# Patient Record
Sex: MALE | Race: WHITE | Employment: UNEMPLOYED | URBAN - METROPOLITAN AREA
[De-identification: names, ages, dates, MRNs, and addresses within clinical notes are randomized per-mention and may not be internally consistent; named-entity substitution may affect disease eponyms.]

---
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
## 2024-01-18 ENCOUNTER — RX ONLY (RX ONLY)
Age: 19
End: 2024-01-18

## 2024-01-18 RX ORDER — KETOCONAZOLE 20 MG/ML
SHAMPOO, SUSPENSION TOPICAL
Qty: 120 | Refills: 6 | Status: ERX | COMMUNITY
Start: 2024-01-18

## 2024-01-18 RX ORDER — CLOBETASOL PROPIONATE 0.5 MG/G
AEROSOL, FOAM TOPICAL
Qty: 100 | Refills: 2 | Status: ERX | COMMUNITY
Start: 2024-01-18

## 2024-03-18 ENCOUNTER — OFFICE VISIT (OUTPATIENT)
Age: 19
End: 2024-03-18
Payer: COMMERCIAL

## 2024-03-18 VITALS
HEART RATE: 117 BPM | DIASTOLIC BLOOD PRESSURE: 82 MMHG | RESPIRATION RATE: 20 BRPM | OXYGEN SATURATION: 98 % | BODY MASS INDEX: 28.99 KG/M2 | TEMPERATURE: 97.4 F | SYSTOLIC BLOOD PRESSURE: 118 MMHG | HEIGHT: 65 IN | WEIGHT: 174 LBS

## 2024-03-18 DIAGNOSIS — E10.65 TYPE 1 DIABETES MELLITUS WITH HYPERGLYCEMIA (HCC): ICD-10-CM

## 2024-03-18 DIAGNOSIS — E10.65 TYPE 1 DIABETES MELLITUS WITH HYPERGLYCEMIA (HCC): Primary | ICD-10-CM

## 2024-03-18 LAB — HBA1C MFR BLD: 8.1 %

## 2024-03-18 PROCEDURE — 99215 OFFICE O/P EST HI 40 MIN: CPT | Performed by: STUDENT IN AN ORGANIZED HEALTH CARE EDUCATION/TRAINING PROGRAM

## 2024-03-18 PROCEDURE — 83036 HEMOGLOBIN GLYCOSYLATED A1C: CPT | Performed by: STUDENT IN AN ORGANIZED HEALTH CARE EDUCATION/TRAINING PROGRAM

## 2024-03-18 PROCEDURE — 95251 CONT GLUC MNTR ANALYSIS I&R: CPT | Performed by: STUDENT IN AN ORGANIZED HEALTH CARE EDUCATION/TRAINING PROGRAM

## 2024-03-18 NOTE — PATIENT INSTRUCTIONS
Seen for type 1 diabetes.  HbA1c today: 8.1%.  Target is <7.0%.         Plan  Importance of compliance reinforced   Send us records in a week to review for any insulin dose adjustements  Review checking ketones when vomiting, 2 consecutive blood glucose above 350,  illness  When trace or small drink more water and keep checking until negative. If moderate or large give us a call #[phone]  Target before activity >150, if below get something with carbs,protein and fat (granula bar) .           Yearly eye exams are recommended after you have had diabetes for 3-5 years  Dental exams every 6 months are recommended  Flu vaccine is recommended every year, as early in the season as possible  Medical ID should be worn at all times  Continue rotating injection/insertion sites  Annual labs are due: Ordered today.  Will give family a call to discuss the results once I receive them.    New insulin regimen:    Humalog insulin via Omnipod insulin pump as follows    Insulin Instructions  Pump Settings   Omnipod 5 G6 Pods (Gen 5) Misc   Last edited by Renard Cote RD on 3/18/2024 at 1:39 PM      Basal Rate   Total Basal Dose: 48.75 units/day   Time units/hr   12:00 AM 1.05    4:00 AM 2.15    6:00 AM 2.45    5:00 PM 1.9      Blood Glucose Target   Time mg/dL   12:00  - 110      Sensitivity Factor   Time mg/dL/unit   12:00 AM 30    6:00 AM 20      Carb Ratio   Time g/unit   12:00 AM 5

## 2024-03-18 NOTE — PROGRESS NOTES
[facility] Encounter with Harry Cohen for follow up of Type 1 Diabetes. Accompanied today by mom  and dad.      Renard Cote RD, [facility]    Start time: 1:29 PM EDT  End Time: 1:34 PM EDT    Total time: 5 minutes spent with this clinician      Complete insulin delivery via: Omnipod 5  Insights from device download: Dexcom G6 with phone zachery    Time in Range (  mg/dl): 64%1% low  TDD: 68.3 units     Not in school and employer retired in December; may be going back to school but right now he is not getting much acitivity      [x] Glucagon - GVOKE Reviewed how to use and ensure that they check the expiration date  [x] Ketones - discussed need to use with stress/illness/elevated blood sugar- less than 6 months old if opened. Need for home and school   [x] Injection sites  - ABDOMEN, POSTERIOR ARM, and THIGH ; Rotations of these sites Yes  Signs of Overuse to same area or lipohypertrophy: No  [x] Carb counting skills assessed including resources used - no issues     Family requested new camo bag and cleaned up his profile in the controller to get rid of duplicate entries.     Pt considering a  technical school that has a nursing staff so dad is going to have them call us to get the info they need to consider him as a student. Let dad know we can provided a OP5 resource book and caregiver guide and if they need dexcom info we have that as well.        Diagnosis date: 1/2014   Length of diabetes diagnosis: 10 years         Recent Results (from the past 12 hour(s))   AMB POC HEMOGLOBIN A1C    Collection Time: 03/18/24  1:30 PM   Result Value Ref Range    Hemoglobin A1C, POC 8.1 %       Hemoglobin A1C, POC   Date Value Ref Range Status   03/18/2024 8.1 % Final   12/12/2023 8.2 % Final   08/11/2023 8.0 % Final     Hemoglobin A1C   Date Value Ref Range Status   02/06/2023 7.8 (H) 4.0 - 5.6 % Final     Comment:     NEW METHOD  PLEASE NOTE NEW REFERENCE RANGE  (NOTE)  HbA1C Interpretive Ranges  <5.7              
neuropathy, nephropathy and retinopathy reviewed. Acute complications like diabetes ketoacidosis and dehydration and electrolyte abnormalities discussed  Follow up in 3 months          Patient Instructions     Seen for type 1 diabetes.  HbA1c today: 8.1%.  Target is <7.0%.         Plan  Importance of compliance reinforced   Send us records in a week to review for any insulin dose adjustements  Review checking ketones when vomiting, 2 consecutive blood glucose above 350,  illness  When trace or small drink more water and keep checking until negative. If moderate or large give us a call #[phone]  Target before activity >150, if below get something with carbs,protein and fat (granula bar) .           Yearly eye exams are recommended after you have had diabetes for 3-5 years  Dental exams every 6 months are recommended  Flu vaccine is recommended every year, as early in the season as possible  Medical ID should be worn at all times  Continue rotating injection/insertion sites  Annual labs are due: Ordered today.  Will give family a call to discuss the results once I receive them.    New insulin regimen:    Humalog insulin via Omnipod insulin pump as follows    Insulin Instructions  Pump Settings   Omnipod 5 G6 Pods (Gen 5) Misc   Last edited by Renard Cote RD on 3/18/2024 at 1:39 PM      Basal Rate   Total Basal Dose: 48.75 units/day   Time units/hr   12:00 AM 1.05    4:00 AM 2.15    6:00 AM 2.45    5:00 PM 1.9      Blood Glucose Target   Time mg/dL   12:00  - 110      Sensitivity Factor   Time mg/dL/unit   12:00 AM 30    6:00 AM 20      Carb Ratio   Time g/unit   12:00 AM 5       Total time: 40minutes  Time spent counseling patient/family: 50%    Parts of these notes were done by Dragon dictation and may be subject to inadvertent grammatical errors due to issues of voice recognition.    Madan Balderas MD

## 2024-03-22 ENCOUNTER — PATIENT MESSAGE (OUTPATIENT)
Age: 19
End: 2024-03-22

## 2024-03-22 NOTE — TELEPHONE ENCOUNTER
Insulin Instructions  Pump Settings   Omnipod 5 G6 Pods (Gen 5) Misc   Last edited by Renard Cote RD on 3/18/2024 at 1:39 PM      Basal Rate   Total Basal Dose: 48.75 units/day   Time units/hr   12:00 AM 1.05    4:00 AM 2.15    6:00 AM 2.45    5:00 PM 1.9      Blood Glucose Target   Time mg/dL   12:00  - 110      Sensitivity Factor   Time mg/dL/unit   12:00 AM 30    6:00 AM 20      Carb Ratio   Time g/unit   12:00 AM 5

## 2024-03-26 NOTE — PROCEDURE: MIPS QUALITY
Problem: Adult Inpatient Plan of Care  Goal: Absence of Hospital-Acquired Illness or Injury  Intervention: Identify and Manage Fall Risk  Recent Flowsheet Documentation  Taken 3/26/2024 0801 by Orta, Felicity, RN  Safety Promotion/Fall Prevention:   increased rounding and observation   activity supervised   room near nurse's station     Problem: Adult Inpatient Plan of Care  Goal: Absence of Hospital-Acquired Illness or Injury  Intervention: Prevent Skin Injury  Recent Flowsheet Documentation  Taken 3/26/2024 0801 by Felicity Orta RN  Body Position: position changed independently     Problem: Adult Inpatient Plan of Care  Goal: Optimal Comfort and Wellbeing  Intervention: Monitor Pain and Promote Comfort  Recent Flowsheet Documentation  Taken 3/26/2024 1043 by Felicity Orta RN  Pain Management Interventions:   care clustered   distraction   emotional support   pillow support provided   quiet environment facilitated   rest  Taken 3/26/2024 0957 by Felicity Orta RN  Pain Management Interventions: medication (see MAR)  Taken 3/26/2024 0801 by Felicity Orta RN  Pain Management Interventions:   care clustered   distraction   emotional support   pillow support provided   quiet environment facilitated   rest     Problem: Pain Acute  Goal: Optimal Pain Control and Function  Intervention: Develop Pain Management Plan  Recent Flowsheet Documentation  Taken 3/26/2024 1043 by Felicity Orta RN  Pain Management Interventions:   care clustered   distraction   emotional support   pillow support provided   quiet environment facilitated   rest  Taken 3/26/2024 0957 by Felicity Orta RN  Pain Management Interventions: medication (see MAR)  Taken 3/26/2024 0801 by Felicity Orta RN  Pain Management Interventions:   care clustered   distraction   emotional support   pillow support provided   quiet environment facilitated   rest     Problem: Pain Acute  Goal: Optimal Pain Control and 
Function  Intervention: Prevent or Manage Pain  Recent Flowsheet Documentation  Taken 3/26/2024 0801 by Felicity Orta RN  Medication Review/Management: medications reviewed     Problem: Pain Acute  Goal: Optimal Pain Control and Function  Intervention: Optimize Psychosocial Wellbeing  Recent Flowsheet Documentation  Taken 3/26/2024 0801 by Felicity Orta RN  Supportive Measures: active listening utilized   Goal Outcome Evaluation:  Patient alert and oriented x4. Moving independently in the room. Vitals stable on room air. IV antibiotics. Redness and swelling in LLE. Regular diet with adequate intake. Blood sugars stable. Pain in LLE managed using PRN tylenol. Saline locked.                        
Quality 130: Documentation Of Current Medications In The Medical Record: Current Medications Documented
Quality 110: Preventive Care And Screening: Influenza Immunization: Influenza immunization was not ordered or administered, reason not given
Detail Level: Detailed
Quality 431: Preventive Care And Screening: Unhealthy Alcohol Use - Screening: Patient screened for unhealthy alcohol use using a single question and scores less than 2 times per year
Quality 47: Advance Care Plan: Advance Care Planning discussed and documented; advance care plan or surrogate decision maker documented in the medical record.
Quality 402: Tobacco Use And Help With Quitting Among Adolescents: Patient screened for tobacco and never smoked

## 2024-03-28 ENCOUNTER — TELEPHONE (OUTPATIENT)
Age: 19
End: 2024-03-28

## 2024-03-28 LAB
CHOLEST SERPL-MCNC: 153 MG/DL (ref 100–169)
HBA1C MFR BLD: 7.8 % (ref 4.8–5.6)
HDLC SERPL-MCNC: 39 MG/DL
IGA SERPL-MCNC: 86 MG/DL (ref 90–386)
LDLC SERPL CALC-MCNC: 100 MG/DL (ref 0–109)
TRIGL SERPL-MCNC: 73 MG/DL (ref 0–89)
TSH SERPL DL<=0.005 MIU/L-ACNC: 1.21 UIU/ML (ref 0.45–4.5)
VLDLC SERPL CALC-MCNC: 14 MG/DL (ref 5–40)

## 2024-03-28 RX ORDER — INSULIN LISPRO 100 [IU]/ML
INJECTION, SOLUTION INTRAVENOUS; SUBCUTANEOUS
Qty: 15 ML | Refills: 1 | Status: SHIPPED | OUTPATIENT
Start: 2024-03-28

## 2024-03-28 NOTE — TELEPHONE ENCOUNTER
----- Message from Madan Balderas MD sent at 3/28/2024  9:44 AM EDT -----  Normal lipid panel [cholesterol levels].  Please call family

## 2024-03-28 NOTE — TELEPHONE ENCOUNTER
insulin lispro (HUMALOG) 100 UNIT/ML Quick Pen    MomJoanne is calling to request a refill on the above medication. Please advise    Joanne - mom   #  [phone]     [id number]  [address]

## 2024-03-29 ENCOUNTER — TELEPHONE (OUTPATIENT)
Age: 19
End: 2024-03-29

## 2024-03-29 LAB
ALBUMIN/CREAT UR: 12 MG/G CREAT (ref 0–29)
CREAT UR-MCNC: 138.3 MG/DL
IMP & REVIEW OF LAB RESULTS: NORMAL
Lab: NORMAL
MICROALBUMIN UR-MCNC: 16 UG/ML
TTG IGA SER-ACNC: <2 U/ML (ref 0–3)

## 2024-03-29 NOTE — TELEPHONE ENCOUNTER
----- Message from Madan Balderas MD sent at 3/29/2024  2:15 PM EDT -----  Normal urine screen.  Please call family.

## 2024-04-04 ENCOUNTER — PATIENT MESSAGE (OUTPATIENT)
Age: 19
End: 2024-04-04

## 2024-04-04 NOTE — TELEPHONE ENCOUNTER
From: Harry Cohen  To: Dr. Madan Balderas  Sent: 4/4/2024 12:27 PM EDT  Subject: Harry at [facility]    Good afternoon Dr. Balderas    Harry is currently at the [facility] for 6 weeks. He has been there since Tuesday morning and has been experiencing lows at different times of the day and at night. His schedule is much different than when he was at home. He is getting up early, eating meals at different times and is not able to graze like at home. He has been getting up at 6:00am and eating his breakfast at 6:30am. His lunch at 11:30am and his dinner at 5:00pm. Harry has been going to bed around 8:30-9:00pm. Could you please take a look at his numbers and make adjustments?    Thank you,      Joanne Cohen

## 2024-04-19 NOTE — LETTER
4/22/2022    Patient: Shmuel Newton   YOB: 2005   Date of Visit: 4/22/2022     Beckie Ellis MD  [address] [facility] [address]  Via Fax: [phone]    Dear Beckie Ellis MD,      Thank you for referring Mr. Wilton Haynes to [facility] - [facility] for evaluation. My notes for this consultation are attached. Chief Complaint   Patient presents with    Follow-up     diabetes         RDN OLU met with Shmuel Newton for follow up of type 1 diabetes. poc A1c 8.8% today from 8.9% on 1/21/2022. Dexcom showing hyperglycemia overnight. Reports dinnertime usually [id number] and snacking afterwards but not consistently bolusing for evening snacks. Correction boluses noted as being given post-snack. Discussed importance of entering carb amounts for snacks as post-corrections can be a safety concern for hypoglycemia. Use of extended bolus feature reviewed for high-fat foods such as ice cream, chinese, pizza. Family aware of how to utilize extended boluses. Reviewed Omnipod 5 features with Smart Adjust Technology for managing blood glucose levels. Type 1 DM on OmniPod insulin pump here for follow-up  Idiopathic short stature    History of present illness:    Joe Girard is a 15 y.o. 5 m.o. male who is followed in [facility] for type 4/27/2018 diabetes. He was present today with his parents. Joe Girard was originally diagnosed with diabetes at age 8yrs. His last visit in diabetes clinic was on 1/21/2022 and hemoglobin A1c was 8.9 %. Has been well since then. Reports no ketonuria since last clinic visit. Poor growth:  growth hormone levels came back normal. He also had a normal thyroid studies. Labs done on 1/23/2019 were significant for normal LH, FSH, testosterone as well normal male karyotype.  Screening labs have so far shown normal  IGF-I and BP 3 level, normal thyroid studies,normal puberty labs, CC: plagiocephaly - Initial Evaluation    HPI: This is a 5 m.o. male with an abnormal head shape that has been present for months. He is seen in the company of his mother. This is congenital in context. There are no modifying factors and there are no systemic associated signs and symptoms.      The child was born at: term    The head shape at birth was abnormal.    The parents report the head is flat on the right occipital area     The child's parents have been performing therapeutic exercises with the patient with limited improvement in the head shape    The child does have torticollis by report and is in PT    Patient Active Problem List   Diagnosis    Gastroesophageal reflux disease    Congenital maxillary lip tie    Torticollis    Positional plagiocephaly       No past surgical history on file.    No current outpatient medications on file.    Review of patient's allergies indicates:  No Known Allergies    Family History   Problem Relation Name Age of Onset    Anemia Mother Aleyda Cunningham     ADD / ADHD Father      Thyroid disease Maternal Grandmother          on medication    Cancer Maternal Grandmother          tonsils    Hepatitis Maternal Grandfather      COPD Paternal Grandmother      Lung disease Paternal Grandmother      Hyperlipidemia Paternal Grandfather      Hypertension Paternal Grandfather      Diabetes Paternal Grandfather      Gout Paternal Grandfather      Diabetes Other      Cancer Other          breast     SocHx: Ambrose is the second child for his family.     ROS  As above  The child is reported as healthy      PE  Vitals:    04/19/24 1311   Temp: 97.2 °F (36.2 °C)       Physical Exam   Constitutional:The child appears well-nourished. No distress.   HENT:   Head: Atraumatic. Anterior fontanelle is flat.   Right Ear: External ear normal.   Left Ear: External ear normal.   Eyes: Lids are normal. No periorbital edema on the right side. No periorbital edema on the left side.   Cardiovascular: Pulses  are palpable.   Pulmonary/Chest: Effort normal. No nasal flaring. No respiratory distress.    Neurological: The child is alert. Sensory and motor nerves to the face and scalp are intact.   Skin: Skin is warm and moist. Turgor is normal. No jaundice. No signs of injury.     HEAD WIDTH: 122  A-P MEASUREMENT : 154  Right Orbital to Left Occipital: 155  Left Orbital to Right Occipital: 141  Cepahlic Index: 0.792  CRANIAL VAULT ASYMMETRY CALCULATION: 14    The orbits are symmetric.  The ears are symmetric with regard to the cranial base in the axial plane.  The child's sitting head posture is right tilt  There is right occipital flattening.  The right ear is more forward.  There is right frontal bossing.  There is no mastoid bulging present.    Assessment and Plan:  Ian Boggs is a 5 m.o. child with right occipital plagiocephaly with clinically evident torticollis.    I recommend helmet therapy for treatment of the abnormal head shape, and physical therapy for treatment of the torticollis. The patient will follow-up with me as needed.          Medical Decision Making: moderate complexity. Justification for this level of billing is as follows:  I discussed the findings and the child's case with a cranial orthotist. The child has more than two chronic medical conditions (plagiocephaly and torticollis), and a decision was made to initiate helmet therapy, a 3-5 month process.       [facility] - [address] [address] -- [phone]             normal male karyotype. His bone age x-ray at chronological age of 14 years 6 months was approximately 15 years. At this bone age Jero Chandler does not have much room left to grow. Started letrozole to decrease bone age advancement. Also started growth hormone on 4/6/2019. 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 Discontinued letrozole and growth hormone therapy. Denies headache,tiredness, problems with peripheral vision,constipation/diarrhea,heat/cold intolerance,polyuria,polydipsia. Interval growth in height. Blood glucoses:   Pump download: 2week BG average: 209mg/dl. Insulin:  Humalog insulin via Omnipod insulin pump as follows:   Basal : 12a: 1.1 u/hr, 4a: 2.2u/hr,  6a: 2.5u/hr,10a: 2.5u/hr, 5p: 1.65u/hr,     Total basal insulin in case of pump failure: 47.85units     I:C: 12a: 1u: [address], 6a:1u:6gCHO, 6p: 1u: 8CHO      ISF:12a: 40, 6a: 20, 8p: 40     Threshhold: 12a:120  , 6a: 100, 8p:120   Pump site is changed every 2days. Bolus insulin is given prior to meals. Having snacks late in the evening which he is not covering for. Last Eye exam:Reprots he had one done 2months ago    Medical ID:  Worn sometimes    Vaccination: Received first 2 doses of COVID-vaccine    Screening labs:  TSH   Date Value Ref Range Status   01/21/2022 1.27 0.36 - 3.74 uIU/mL Final     Comment:       Due to TSH heterogeneity, both structurally and degree of glycosylation,  monoclonal antibodies used in the TSH assay may not accurately quantitate TSH. Therefore, this result should be correlated with clinical findings as well as  with other assessments of thyroid function, e.g., free T4, free T3.        No components found for: Marty Jo  Lab Results   Component Value Date/Time    Cholesterol, total 168 01/21/2022 10:25 AM    HDL Cholesterol 38 01/21/2022 10:25 AM    LDL, calculated 84.8 01/21/2022 10:25 AM    VLDL, calculated 45.2 01/21/2022 10:25 AM Triglyceride 226 (H) 01/21/2022 10:25 AM    CHOL/HDL Ratio 4.4 01/21/2022 10:25 AM       Past Medical History:   Diagnosis Date    Autism     Diabetes ([address]        Social History:  No change in social hx since last clinic history    Review of systems:  12 point ROS completed by me and is negative except as indicated above in HPI    Medications:  Current Outpatient Medications   Medication Sig    citalopram (CELEXA) 40 mg tablet     risperiDONE (RisperDAL) 1 mg tablet     glucagon (Gvoke HypoPen 2-Pack) 1 mg/0.2 mL atIn 1 mg by SubCUTAneous route as needed (severe low blood sugar or unconsciousness).  Blood-Glucose Sensor (Dexcom G6 Sensor) sonia USE TO CHECK BLOOD SUGAR   CHANGE EVERY 10 DAYS    Dexcom G6 Transmitter sonia TO BE USED TO CHECK BLOOD  SUGARS WITH DEXCOM SENSORS    Blood-Glucose Meter (Contour Next One Meter) misc Use to check BG 4-6 times daily    glucose blood VI test strips (Contour Next Test Strips) strip Use to check BG 4-6 times daily    Insulin Pump Cartridge (Omnipod Dash 5 Pack Pod) crtg Pods for Omnipod DASH insulin pump. 45 pods for 90 days, changed every 2 days.  insulin lispro (HUMALOG) 100 unit/mL injection Inject up to 100 units daily via insulin pump. 90-day supply  Indications: type 1 diabetes mellitus    lancets (One Touch Delica) 33 gauge misc Used to check blood sugar up to 6 times daily    FreeStyle Test strip Used to test blood sugar 6x daily    risperiDONE (RISPERDAL) 0.5 mg tablet     Acetone, Urine, Test (KETONE URINE TEST) strip Check urine for ketones if blood sugar greater than 350 or illness or vomiting    insulin glargine (LANTUS SOLOSTAR U-100 INSULIN) 100 unit/mL (3 mL) inpn Inject up 50 units daily for pump failure    Insulin Needles, Disposable, (JOSE LUIS PEN NEEDLE) 32 gauge x 5/32\" ndle Use to inject insulin up to 6 times daily    lidocaine-prilocaine (EMLA) topical cream Apply  to affected area as needed for Pain.  For insulin pod and CGM insertion  somatropin (HUMATROPE) 24 mg (72 unit) crtg 2 mg by SubCUTAneous route daily. (Patient not taking: Reported on 1/21/2022)     No current facility-administered medications for this visit. Allergies:  No Known Allergies        Objective:       Visit Vitals  /85 (BP 1 Location: Right arm, BP Patient Position: Sitting)   Pulse 89   Temp 98 °F (36.7 °C)   Resp 19   Ht 5' 5.04\" (1.652 m)   Wt 195 lb 6 oz (88.6 kg)   SpO2 97%   BMI 32.47 kg/m²     Blood pressure reading is in the Stage 1 hypertension range (BP >= 130/80) based on the 2017 AAP Clinical Practice Guideline. Weight: 93 %ile (Z= 1.51) based on CDC (Boys, 2-20 Years) weight-for-age data using vitals from 4/22/2022. Height: 7 %ile (Z= -1.49) based on CDC (Boys, 2-20 Years) Stature-for-age data based on Stature recorded on 4/22/2022. BMI: Body mass index is 32.47 kg/m². Percentile: 98 %ile (Z= 2.12) based on CDC (Boys, 2-20 Years) BMI-for-age based on BMI available as of 4/22/2022. Change in weight: Relatively unchanged in the last 3months    In general, Bre Ramirez is alert, well-appearing and in no acute distress. Oropharynx is clear, mucous membranes moist. Neck is supple without lymphadenopathy. Thyroid is smooth and not enlarged. Abdomen is soft, nontender, nondistended, no hepatosplenomegaly. Skin is warm and well perfused. Infusion sites:  clear without evidence of lipohypertrophy. Sexual development: Adult    Laboratory data:  No components found for: [id number]  No results found for this or any previous visit (from the past 12 hour(s)). Orders Only on 01/21/2022   Component Date Value Ref Range Status    Cholesterol, total 01/21/2022 168  <200 MG/DL Final    Triglyceride 01/21/2022 226* <150 MG/DL Final    Comment: Based on NCEP-ATP III:  Triglycerides <150 mg/dL  is considered normal, 150-199  mg/dL  borderline high,  200-499 mg/dL high and  greater than or equal to 500  mg/dL very high.       HDL Cholesterol 01/21/2022 38  34 - 59 MG/DL Final    LDL, calculated 01/21/2022 84.8  0 - 100 MG/DL Final    Comment: Based on the NCEP-ATP: LDL-C concentrations are considered  optimal <100 mg/dL,  near optimal/above Normal 100-129 mg/dL Borderline High: 130-159, High: 160-189  mg/dL Very High: Greater than or equal to 190 mg/dL      VLDL, calculated 01/21/2022 45.2  MG/DL Final    CHOL/HDL Ratio 01/21/2022 4.4  0.0 - 5.0   Final    Microalbumin,urine random 01/21/2022 0.87  MG/DL Final    No reference range has been established.  Creatinine, urine 01/21/2022 192.00  mg/dL Final    No reference range has been established.  Microalbumin/Creat ratio (mg/g cre* 01/21/2022 5  0 - 30 mg/g Final    T4, Free 01/21/2022 0.9  0.8 - 1.5 NG/DL Final    TSH 01/21/2022 1.27  0.36 - 3.74 uIU/mL Final    Comment:   Due to TSH heterogeneity, both structurally and degree of glycosylation,  monoclonal antibodies used in the TSH assay may not accurately quantitate TSH. Therefore, this result should be correlated with clinical findings as well as  with other assessments of thyroid function, e.g., free T4, free T3.  Vitamin D 25-Hydroxy 01/21/2022 22.2* 30 - 100 ng/mL Final    Comment: (NOTE)  Deficiency               <20 ng/mL  Insufficiency          20-30 ng/mL  Sufficient             ng/mL  Possible toxicity       >100 ng/mL    The Method used is Siemens Advia Centaur currently standardized to a   Center of Disease Control and Prevention (CDC) certified reference   [address]. Samples containing fluorescein dye can produce falsely   elevated values when tested with the ADVIA Centaur Vitamin D Assay. It is recommended that results in the toxic range, >100 ng/mL, be   retested 72 hours post fluorescein exposure.  SAMPLES BEING HELD 01/21/2022 URINE    Final    COMMENT 01/21/2022 Add-on orders for these samples will be processed based on acceptable specimen integrity and analyte stability, which may vary by analyte. Final   Office Visit on 01/21/2022   Component Date Value Ref Range Status    Hemoglobin A1c (POC) 01/21/2022 8.9  % Preliminary      No results found for this or any previous visit (from the past 12 hour(s)). Yearly screening labs done in 1/2022 came back with normal thyroid studies, normal urine screen, lipid panel with elevated TG,normal total chol and LDL, insufficient vitamin D level. Assessment:       Jakob Doherty is a 15 y.o. 5 m.o. male presenting for follow up of type 1 diabetes, under fair control. Hemoglobin A1c today was 8.8 %, above the ADA target of less than 7.5% and slightly decreased from the last clinic visit. BG averages above target . Not covering for all carbs. We stressed the importance of covering for all premeal BGs as well as carbs. We would make some insulin dose changes as shown below. Send us BG records in 2weeks to review for any further insulin dose changes. Let us know sooner if he is having lows. Plan:   Reviewed growth charts and labs with family  Reviewed hypoglycemia and how to manage hypoglycemia including when to use glucagon (for severe hypoglycemia, LOC,seizure)  Reviewed ketones check and how to management positve ketones with family  Hemoglobin A1C reviewed. Correlation between A1C and long term complications like neuropathy, nephropathy and retinopathy reviewed. Acute complications like diabetes ketoacidosis and dehydration and electrolyte abnormalities discussed  Follow up in 3 months  School forms: No          Patient Instructions   Seen for type 1 diabetes.  HbA1c today is 8.8% Target is <7.5%.         Plan  Importance of compliance reinforced   Send us records in a week to review for any insulin dose adjustements  Review checking ketones when vomiting, 2 consecutive blood glucose above 350,  illness  When trace or small drink more water and keep checking until negative.  If moderate or large give us a call #[phone]  Target before activity >150, if below get something with carbs,protein and fat (granula bar) . Reviewed swimming precautions.          Yearly eye exams are recommended after you have had diabetes for 3-5 years  Dental exams every 6 months are recommended  Flu vaccine is recommended every year, as early in the season as possible  Medical ID should be worn at all times  Continue rotating injection/insertion sites  Annual labs are due: 7/2022    New insulin regimen:    Humalog insulin via Omnipod insulin pump as follows:   Basal : 12a:  1.15u/hr,  4a:2.25u/hr, 6a:2.55u/hr, 5p: 1.7u/hr     Total basal insulin in case of pump failure:49units     I:C: 12a: 1u: 8gCHO, 6a:1u:6gCHO, 6p:1u:8gCHO,        ISF:12a: 40, 6a: 20, 11a: 20, 8p: 40     Threshhold: 12a:120  , 7a: 100, 8p:120        It is recommended that Efraín Isabell Morel,  a person with Type 1 diabetes whom has reached the age of 7years old OR has begun puberty OR been diagnosed for five years  needs an annual dilated eye exam. Please scheduled an appointment as soon as possible. When this exam is completed, have the provider send Jorgeindheri Patient results to our office via fax at [phone]. Here are some options but it is recommended that you check with you insurance company to see which providers are within your insurance coverage. [facility]   [facility]  [address]  [address]  [facility] [address]  [address]  [facility], [address] [facility], [address] [facility]   [address], [address] [facility] [address], [address]   [place], [address] [facility] [address]     [address] [facility]    [address] [facility], [address] [facility], [phone]    [facility] of [address] [place] [address]. Mercy Perera    [phone]                Orders Placed This Encounter    REFERRAL TO OPHTHALMOLOGY     Referral Priority:   Routine     Referral Type:   Consultation     Referral Reason:   Specialty Services Required     Number of Visits Requested:   1         Total time: 40minutes  Time spent counseling patient/family: 50%    Parts of these notes were done by Dragon dictation and may be subject to inadvertent grammatical errors due to issues of voice recognition. Matthew Rodriguez MD          If you have questions, please do not hesitate to call me. I look forward to following your patient along with you.       Sincerely,    Matthew Rodriguez MD

## 2024-05-20 ENCOUNTER — PATIENT MESSAGE (OUTPATIENT)
Age: 19
End: 2024-05-20

## 2024-05-20 DIAGNOSIS — E10.65 TYPE 1 DIABETES MELLITUS WITH HYPERGLYCEMIA (HCC): Primary | ICD-10-CM

## 2024-05-20 RX ORDER — PROCHLORPERAZINE 25 MG/1
SUPPOSITORY RECTAL
Qty: 1 EACH | Refills: 3 | Status: SHIPPED | OUTPATIENT
Start: 2024-05-20

## 2024-05-20 RX ORDER — INSULIN PMP CART,AUT,G6/7,CNTR
EACH SUBCUTANEOUS
Qty: 45 EACH | Refills: 3 | Status: SHIPPED | OUTPATIENT
Start: 2024-05-20

## 2024-05-20 RX ORDER — PROCHLORPERAZINE 25 MG/1
SUPPOSITORY RECTAL
Qty: 9 EACH | Refills: 3 | Status: SHIPPED | OUTPATIENT
Start: 2024-05-20

## 2024-05-20 NOTE — TELEPHONE ENCOUNTER
[facility] pharmacy called x3, first call dropped as representative answered ~3 min hold.    2nd call hold time 13 min    Juany clinical pharmacist provided the following information: \"order in processing now\"    Last fill date 4/4/2024   Disp qty 10 pods  Next fill date 6/10/24    Requesting clarification for dispense report in EPIC for 35 pods on 4/27/24 when call was dropped.     3rd call hold time 12 min, rang once, off hold music but no one speaking on the other line.     4th call hold time 10:20 min    Spoke to Afshan who confirmed 4/27/24 order for 35 pods was never dispensed. Running via insurance in order to process remaining 90-day quantity.     Prior Auth needed for additional quantity to complete 90-day supply. Transferred to PA department: Laura     Clinical answers completed with turn-around 24 hours via fax  Ref # [id number]

## 2024-05-20 NOTE — TELEPHONE ENCOUNTER
Insulin Instructions  OMNIPOD 5 AUTO MODE   Omnipod 5 G6 Pods (Gen 5) Misc   Last edited by Amy Sexton RN on 4/4/2024 at 1:07 PM      Basal Rate   Total Basal Dose: 43.9 units/day   Time units/hr   12:00 AM 0.95    4:00 AM 2    6:00 AM 2.2    5:00 PM 1.7      Blood Glucose Target   Time mg/dL   12:00  - 130    6:00  - 110   10:00  - 130      Sensitivity Factor   Time mg/dL/unit   12:00 AM 30    6:00 AM 20      Carb Ratio   Time g/unit   12:00 AM 5.5

## 2024-05-23 NOTE — TELEPHONE ENCOUNTER
5/23/2024 0821  [facility] [phone] total call time 48 mins     [facility] claim    Transferred x4    Provider line: [phone]  Help desk for overrides: [phone]    Jocelynn from provider line able to assist.     Claim for 20 pods for 60 days processed yesterday, had to explain this is still the incorrect quantity and leaving patient 15 pods short.     Outcome of call:  Pulled back 20 per 60 day order to process 45 pods today (5/23/2024) with express shipping estimated to arrive Saturday   Can expedite for $15 for express shipping  arrive Saturday (5/25/24) or Tuesday (5/28/24) at the latest due to Memorial Day holiday.    Pharmacy stated the family needs to inform of errors in first place    Contacted family to relay above information, spoke to dad who is in the hospital with Joanne (mom) due to a suspected stroke.    Dad thinks Sebastian has ~5 pods left which should get him through to next week. Family to contact PEDA team for instructions if run out prior to upcoming pod shipment for instructions on restarting MDI insulin.     Dad confirmed understanding of the above.

## 2024-05-23 NOTE — TELEPHONE ENCOUNTER
5/23/24 1122    Afshan confirmed 45 pods for 90 day supply processed with paid claim by insurance. Shipment will be expedited per pharmacy.

## 2024-05-23 NOTE — TELEPHONE ENCOUNTER
5/23/24 130Sandra Cabezas transferred to  [facility]    The previously confirmed order from earlier today was not actually resolved. The order is non-existent in their system.     NOW sending 35 pods (remaining supply from 4/4/24 order) with expedited shipping.  Order number: [id number]  i.e. [id number]    [facility] to follow up with family on Tuesday to confirm shipment.    Confirmed 45 per 90 day supply of pods moving forward.     Auto-refills also set up for Dexcom after clarification of CGM vs pump.

## 2024-06-21 ENCOUNTER — OFFICE VISIT (OUTPATIENT)
Age: 19
End: 2024-06-21

## 2024-06-21 VITALS
OXYGEN SATURATION: 99 % | DIASTOLIC BLOOD PRESSURE: 84 MMHG | HEART RATE: 105 BPM | WEIGHT: 167.25 LBS | SYSTOLIC BLOOD PRESSURE: 139 MMHG | RESPIRATION RATE: 17 BRPM | TEMPERATURE: 98.2 F | HEIGHT: 65 IN | BODY MASS INDEX: 27.86 KG/M2

## 2024-06-21 DIAGNOSIS — E10.9 TYPE 1 DIABETES MELLITUS WITHOUT COMPLICATIONS (HCC): Primary | ICD-10-CM

## 2024-06-21 LAB — HBA1C MFR BLD: 7 %

## 2024-06-21 RX ORDER — PAROXETINE HYDROCHLORIDE 20 MG/1
TABLET, FILM COATED ORAL
COMMUNITY
Start: 2024-06-19

## 2024-06-21 RX ORDER — INSULIN LISPRO 100 [IU]/ML
INJECTION, SOLUTION INTRAVENOUS; SUBCUTANEOUS
Qty: 90 ML | Refills: 3 | Status: SHIPPED | OUTPATIENT
Start: 2024-06-21

## 2024-06-21 RX ORDER — DULOXETIN HYDROCHLORIDE 60 MG/1
CAPSULE, DELAYED RELEASE ORAL
COMMUNITY
Start: 2024-06-19

## 2024-06-21 ASSESSMENT — PATIENT HEALTH QUESTIONNAIRE - PHQ9
2. FEELING DOWN, DEPRESSED OR HOPELESS: NOT AT ALL
SUM OF ALL RESPONSES TO PHQ QUESTIONS 1-9: 0
1. LITTLE INTEREST OR PLEASURE IN DOING THINGS: NOT AT ALL
SUM OF ALL RESPONSES TO PHQ9 QUESTIONS 1 & 2: 0

## 2024-06-21 NOTE — PROGRESS NOTES
Chief Complaint   Patient presents with    Follow-up     Diabetes       
[facility] Encounter with Harry Cohen for follow up of Type 1 Diabetes. Accompanied today by mom  and dad.      VANGIE JAMISON RD, [facility]    Start time: 11:50 AM EDT  End Time: 12:05 PM EDT    Total time: 15 minutes spent with this clinician      Complete insulin delivery via: Omnipod 5 insulin pump  Insights from device download: Dexcom G6 with phone zachery    Time in Range (  mg/dl): 72 %; 17% high; 10% very high; 1% low; 100 % automated  TDD: 65.7 units     Pt is giving random boluses because he wants to bring his BS down; usually shortly after first bolus and causing some low BS; other times he is having low BS after dinner so may benefit from a ratio change.  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 Will need a new DMMP for the program.      Insulin Instructions  OMNIPOD 5 AUTO MODE   Omnipod 5 G6 Pods (Gen 5) Misc   Last edited by Amy Sexton, RN on 4/4/2024 at 1:07 PM      Basal Rate   Total Basal Dose: 43.9 units/day   Time units/hr   12:00 AM 0.95    4:00 AM 2    6:00 AM 2.2    5:00 PM 1.7      Blood Glucose Target   Time mg/dL   12:00  - 130    6:00  - 110   10:00  - 130      Sensitivity Factor   Time mg/dL/unit   12:00 AM 30    6:00 AM 20      Carb Ratio   Time g/unit   12:00 AM 5.5        [x] Glucagon - GVOKE Reviewed how to use and ensure that they check the expiration date  [x] Ketones - discussed need to use with stress/illness/elevated blood sugar- less than 6 months old if opened. Need for home and school   [x] Carb counting skills assessed including resources used - pt is doing but issues at school with accuracy and gaps in information for him     Discussed upgrade to Dexcom G7 and I phone zachery; pt has I phone so will not be able to do the G7 if he wants to get rid of using the controller and family verbalized understanding        DMMP 
  03/18/24 (!) 117   12/12/23 (!) 111       Change in weight: Decreased by 3.0 kg in the last 3 months  Change in height: Relatively unchanged in the last 3 months    In general, Harry is alert, well-appearing and in no acute distress.  Oropharynx is clear, mucous membranes moist. Neck is supple without lymphadenopathy. Thyroid is smooth and not enlarged.   Abdomen is soft, nontender, nondistended, no hepatosplenomegaly. Skin is warm and well perfused. Infusion sites:  clear without evidence of lipohypertrophy.  Sexual development: Adult    Laboratory data:    Hemoglobin A1C   Date Value Ref Range Status   03/27/2024 7.8 (H) 4.8 - 5.6 % Final     Comment:                 Prediabetes: 5.7 - 6.4           Diabetes: >6.4           Glycemic control for adults with diabetes: <7.0       Hemoglobin A1C, POC   Date Value Ref Range Status   06/21/2024 7.0 % Final     Component      Latest Ref Rng 3/27/2024   Cholesterol, Total      100 - 169 mg/dL 153    Triglycerides      0 - 89 mg/dL 73    HDL Cholesterol      >39 mg/dL 39 (L)    VLDL      5 - 40 mg/dL 14    LDL Cholesterol      0 - 109 mg/dL 100    Creatinine, Ur      Not Estab. mg/dL 138.3    Albumin, U      Not Estab. ug/mL 16.0    Microalb/Creat Ratio POC      0 - 29 mg/g creat 12    Hemoglobin A1C, POC      %    Hemoglobin A1C      4.8 - 5.6 % 7.8 (H)    TSH      0.450 - 4.500 uIU/mL 1.210    IgA      90 - 386 mg/dL 86 (L)    Interpretation Note    PDF Not applicable    Tissue Transglutaminase IgA      0 - 3 U/mL <2       Legend:  (L) Low  (H) High    Yearly screening labs done in March 2024 came back with normal thyroid studies, normal lipid panel except for slightly low HDL, negative celiac screen, normal urine screen.     Assessment:       Harry is a 18 y.o. male presenting for follow up of type 1 diabetes, under good control.  Hemoglobin A1c today was 7.0 %, just above ADA target of less than 7.0%.  BG averages almost within target with some lows

## 2024-06-21 NOTE — PATIENT INSTRUCTIONS
Seen for type 1 diabetes.  HbA1c today: 7.0%.  Target is <7.0%.         Plan  Importance of compliance reinforced   Send us records in a week to review for any insulin dose adjustements  Review checking ketones when vomiting, 2 consecutive blood glucose above 350,  illness  When trace or small drink more water and keep checking until negative. If moderate or large give us a call #[phone]  Target before activity >150, if below get something with carbs,protein and fat (granula bar) .           Yearly eye exams are recommended after you have had diabetes for 3-5 years  Dental exams every 6 months are recommended  Flu vaccine is recommended every year, as early in the season as possible  Medical ID should be worn at all times  Continue rotating injection/insertion sites  Annual labs are due:3/25    New insulin regimen:    Humalog insulin via Omnipod insulin pump as follows    Insulin Instructions  OMNIPOD 5 AUTO MODE   Omnipod 5 G6 Pods (Gen 5) Misc   Last edited by Renard Cote RD on 6/21/2024 at 12:01 PM      Basal Rate   Total Basal Dose: 28.8 units/day   Time units/hr   12:00 AM 1.2      Blood Glucose Target   Time mg/dL   12:00  - 130    6:00  - 110   10:00  - 130      Sensitivity Factor   Time mg/dL/unit   12:00 AM 30    6:00 AM 20      Carb Ratio   Time g/unit   12:00 AM 5.5    5:00 PM 6.5

## 2024-07-29 DIAGNOSIS — E10.65 TYPE 1 DIABETES MELLITUS WITH HYPERGLYCEMIA (HCC): ICD-10-CM

## 2024-07-29 RX ORDER — INSULIN PMP CART,AUT,G6/7,CNTR
EACH SUBCUTANEOUS
Qty: 45 EACH | Refills: 3 | Status: SHIPPED | OUTPATIENT
Start: 2024-07-29

## 2024-09-06 ENCOUNTER — OFFICE VISIT (OUTPATIENT)
Age: 19
End: 2024-09-06

## 2024-09-06 VITALS
WEIGHT: 189.4 LBS | RESPIRATION RATE: 19 BRPM | OXYGEN SATURATION: 98 % | BODY MASS INDEX: 31.98 KG/M2 | DIASTOLIC BLOOD PRESSURE: 80 MMHG | SYSTOLIC BLOOD PRESSURE: 136 MMHG | HEART RATE: 96 BPM | TEMPERATURE: 97.2 F

## 2024-09-06 DIAGNOSIS — E10.65 TYPE 1 DIABETES MELLITUS WITH HYPERGLYCEMIA (HCC): Primary | ICD-10-CM

## 2024-09-06 LAB — HBA1C MFR BLD: 7.8 %

## 2024-09-06 RX ORDER — PAROXETINE 40 MG/1
TABLET, FILM COATED ORAL
COMMUNITY
Start: 2024-09-03

## 2024-09-06 RX ORDER — OXCARBAZEPINE 300 MG/1
300 TABLET, FILM COATED ORAL 2 TIMES DAILY
COMMUNITY
Start: 2024-09-04

## 2024-09-06 RX ORDER — PAROXETINE 10 MG/1
TABLET, FILM COATED ORAL
COMMUNITY
Start: 2024-08-30

## 2024-09-06 NOTE — PROGRESS NOTES
Chief Complaint   Patient presents with    Follow-up     Diabetes       
[facility] Encounter with Harry Cohen for follow up of Type 1 Diabetes. Accompanied today by mom  and dad.      JENNIFER GARCIA RN, [facility]    Start time: 11:48 AM EDT  End Time: 12:00        Complete insulin delivery via: Omnipod 5 insulin pump  Insights from device download: Dexcom G6 with phone zachery    Time in Range (  mg/dl): 51%  TDD: 87 units    Post prandial boluses  Only 1 day of bolus before 3pm in report     Insulin Instructions  OMNIPOD 5 AUTO MODE   Omnipod 5 G6 Pods (Gen 5) Misc   Last edited by Renard Cote RD on 6/21/2024 at 12:01 PM      Basal Rate   Total Basal Dose: 28.8 units/day   Time units/hr   12:00 AM 1.2      Blood Glucose Target   Time mg/dL   12:00  - 130    6:00  - 110   10:00  - 130      Sensitivity Factor   Time mg/dL/unit   12:00 AM 30    6:00 AM 20      Carb Ratio   Time g/unit   12:00 AM 5.5    5:00 PM 6.5      [x] Glucagon - GVOKE Reviewed how to use and ensure that they check the expiration date  [x] Ketones - discussed need to use with stress/illness/elevated blood sugar- less than 6 months old if opened. Need for home and school   [x] Injection sites  - POSTERIOR ARM and THIGH ; Rotations of these sites Yes  Signs of Overuse to same area or lipohypertrophy: No  [x] Carb counting skills assessed including resources used - 240 grams of carbs per day    Discussed the need for diabetes go bag that would include all diabetes management supplies, treatment for low.     Diagnosis date: 1/2014  Length of diabetes diagnosis: 10 years    Hemoglobin A1C, POC   Date Value Ref Range Status   09/06/2024 7.8 % Final   06/21/2024 7.0 % Final   03/18/2024 8.1 % Final     Hemoglobin A1C   Date Value Ref Range Status   03/27/2024 7.8 (H) 4.8 - 5.6 % Final     Comment:                 Prediabetes: 5.7 - 6.4           Diabetes: >6.4           Glycemic control for adults with diabetes: <7.0     02/06/2023 7.8 (H) 4.0 - 5.6 % Final     Comment:     NEW METHOD  PLEASE 
2-20 Years) data.       Wt Readings from Last 3 Encounters:   09/06/24 85.9 kg (189 lb 6.4 oz)   06/21/24 75.9 kg (167 lb 4 oz) (67%, Z= 0.44)*   03/18/24 78.9 kg (174 lb) (75%, Z= 0.69)*     * Growth percentiles are based on [facility] (Boys, 2-20 Years) data.     Temp Readings from Last 3 Encounters:   09/06/24 97.2 °F (36.2 °C) (Temporal)   06/21/24 98.2 °F (36.8 °C) (Oral)   03/18/24 97.4 °F (36.3 °C) (Temporal)     BP Readings from Last 3 Encounters:   09/06/24 136/80   06/21/24 139/84   03/18/24 118/82     Pulse Readings from Last 3 Encounters:   09/06/24 96   06/21/24 (!) 105   03/18/24 (!) 117       In general, Harry is alert, well-appearing and in no acute distress.  Oropharynx is clear, mucous membranes moist. Neck is supple without lymphadenopathy. Thyroid is smooth and not enlarged.   Abdomen is soft, nontender, nondistended, no hepatosplenomegaly. Skin is warm and well perfused. Infusion sites:  clear without evidence of lipohypertrophy.  Sexual development: Adult    Laboratory data:    Hemoglobin A1C   Date Value Ref Range Status   03/27/2024 7.8 (H) 4.8 - 5.6 % Final     Comment:                 Prediabetes: 5.7 - 6.4           Diabetes: >6.4           Glycemic control for adults with diabetes: <7.0       Hemoglobin A1C, POC   Date Value Ref Range Status   09/06/2024 7.8 % Final     Component      Latest Ref Rng 3/27/2024   Cholesterol, Total      100 - 169 mg/dL 153    Triglycerides      0 - 89 mg/dL 73    HDL Cholesterol      >39 mg/dL 39 (L)    VLDL      5 - 40 mg/dL 14    LDL Cholesterol      0 - 109 mg/dL 100    Creatinine, Ur      Not Estab. mg/dL 138.3    Albumin, U      Not Estab. ug/mL 16.0    Microalb/Creat Ratio POC      0 - 29 mg/g creat 12    Hemoglobin A1C, POC      %    Hemoglobin A1C      4.8 - 5.6 % 7.8 (H)    TSH      0.450 - 4.500 uIU/mL 1.210    IgA      90 - 386 mg/dL 86 (L)    Interpretation Note    PDF Not applicable    Tissue Transglutaminase IgA      0 - 3 U/mL <2

## 2024-09-06 NOTE — PATIENT INSTRUCTIONS
Seen for type 1 diabetes.  HbA1c today: 7.8%.  Target is <7.0%.         Plan  Importance of compliance reinforced   Send us records in a week to review for any insulin dose adjustements  Review checking ketones when vomiting, 2 consecutive blood glucose above 350,  illness  When trace or small drink more water and keep checking until negative. If moderate or large give us a call #[phone]  Target before activity >150, if below get something with carbs,protein and fat (granula bar) .           Yearly eye exams are recommended after you have had diabetes for 3-5 years  Dental exams every 6 months are recommended  Flu vaccine is recommended every year, as early in the season as possible  Medical ID should be worn at all times  Continue rotating injection/insertion sites  Annual labs are due:3/25    New insulin regimen:    Humalog insulin via Omnipod insulin pump as follows    Insulin Instructions  OMNIPOD 5 AUTO MODE   Omnipod 5 G6 Pods (Gen 5) Misc   Last edited by Renard Cote RD on 6/21/2024 at 12:01 PM      Basal Rate   Total Basal Dose: 28.8 units/day   Time units/hr   12:00 AM 1.2      Blood Glucose Target   Time mg/dL   12:00  - 130    6:00  - 110   10:00  - 130      Sensitivity Factor   Time mg/dL/unit   12:00 AM 30    6:00 AM 20      Carb Ratio   Time g/unit   12:00 AM 5.5    5:00 PM 6.5

## 2024-09-13 ENCOUNTER — TELEPHONE (OUTPATIENT)
Age: 19
End: 2024-09-13

## 2024-09-13 NOTE — TELEPHONE ENCOUNTER
Mom Joanne says the referral is not for [facility].  It is for a different relative and mom spoke with Dr Balderas at [facility] last appt about contact information at [facility] for the family relative.    Please advise.    Mom [phone]

## 2024-12-03 DIAGNOSIS — E10.65 TYPE 1 DIABETES MELLITUS WITH HYPERGLYCEMIA (HCC): Primary | ICD-10-CM

## 2024-12-03 RX ORDER — GLUCAGON INJECTION, SOLUTION 1 MG/.2ML
INJECTION, SOLUTION SUBCUTANEOUS
Qty: 1 EACH | Refills: 0 | Status: SHIPPED | OUTPATIENT
Start: 2024-12-03

## 2024-12-05 ENCOUNTER — PATIENT MESSAGE (OUTPATIENT)
Age: 19
End: 2024-12-05

## 2024-12-06 RX ORDER — BLOOD SUGAR DIAGNOSTIC
STRIP MISCELLANEOUS
Qty: 200 EACH | Refills: 2 | Status: SHIPPED | OUTPATIENT
Start: 2024-12-06

## 2024-12-06 RX ORDER — BLOOD-GLUCOSE METER
EACH MISCELLANEOUS
Qty: 2 KIT | Refills: 1 | Status: SHIPPED | OUTPATIENT
Start: 2024-12-06

## 2024-12-06 RX ORDER — LANCETS 33 GAUGE
EACH MISCELLANEOUS
Qty: 200 EACH | Refills: 2 | Status: SHIPPED | OUTPATIENT
Start: 2024-12-06

## 2024-12-11 RX ORDER — BLOOD-GLUCOSE METER
EACH MISCELLANEOUS
Qty: 2 KIT | Refills: 1 | Status: SHIPPED | OUTPATIENT
Start: 2024-12-11

## 2024-12-31 ENCOUNTER — OFFICE VISIT (OUTPATIENT)
Age: 19
End: 2024-12-31
Payer: COMMERCIAL

## 2024-12-31 VITALS
HEART RATE: 96 BPM | RESPIRATION RATE: 20 BRPM | TEMPERATURE: 97.4 F | SYSTOLIC BLOOD PRESSURE: 137 MMHG | BODY MASS INDEX: 34.39 KG/M2 | OXYGEN SATURATION: 100 % | HEIGHT: 65 IN | DIASTOLIC BLOOD PRESSURE: 83 MMHG | WEIGHT: 206.4 LBS

## 2024-12-31 DIAGNOSIS — E10.65 TYPE 1 DIABETES MELLITUS WITH HYPERGLYCEMIA (HCC): Primary | ICD-10-CM

## 2024-12-31 LAB — HBA1C MFR BLD: 8.5 %

## 2024-12-31 PROCEDURE — 95251 CONT GLUC MNTR ANALYSIS I&R: CPT | Performed by: STUDENT IN AN ORGANIZED HEALTH CARE EDUCATION/TRAINING PROGRAM

## 2024-12-31 PROCEDURE — 83036 HEMOGLOBIN GLYCOSYLATED A1C: CPT | Performed by: STUDENT IN AN ORGANIZED HEALTH CARE EDUCATION/TRAINING PROGRAM

## 2024-12-31 PROCEDURE — 3051F HG A1C>EQUAL 7.0%<8.0%: CPT | Performed by: STUDENT IN AN ORGANIZED HEALTH CARE EDUCATION/TRAINING PROGRAM

## 2024-12-31 PROCEDURE — 99215 OFFICE O/P EST HI 40 MIN: CPT | Performed by: STUDENT IN AN ORGANIZED HEALTH CARE EDUCATION/TRAINING PROGRAM

## 2024-12-31 RX ORDER — ACYCLOVIR 400 MG/1
TABLET ORAL
Qty: 3 EACH | Refills: 3 | Status: SHIPPED | OUTPATIENT
Start: 2024-12-31

## 2024-12-31 RX ORDER — ACYCLOVIR 400 MG/1
TABLET ORAL
Qty: 3 EACH | Refills: 3 | Status: SHIPPED | OUTPATIENT
Start: 2024-12-31 | End: 2024-12-31

## 2024-12-31 ASSESSMENT — PATIENT HEALTH QUESTIONNAIRE - PHQ9
SUM OF ALL RESPONSES TO PHQ9 QUESTIONS 1 & 2: 0
1. LITTLE INTEREST OR PLEASURE IN DOING THINGS: NOT AT ALL
SUM OF ALL RESPONSES TO PHQ QUESTIONS 1-9: 0
2. FEELING DOWN, DEPRESSED OR HOPELESS: NOT AT ALL
SUM OF ALL RESPONSES TO PHQ QUESTIONS 1-9: 0

## 2024-12-31 NOTE — PATIENT INSTRUCTIONS
Seen for type 1 diabetes.  HbA1c today: 8.5%.  Target is <7.0%.         Plan  Importance of compliance reinforced   Send us records in a week to review for any insulin dose adjustements  Review checking ketones when vomiting, 2 consecutive blood glucose above 350,  illness  When trace or small drink more water and keep checking until negative. If moderate or large give us a call #[phone]  Target before activity >150, if below get something with carbs,protein and fat (granula bar) .           Yearly eye exams are recommended after you have had diabetes for 3-5 years  Dental exams every 6 months are recommended  Flu vaccine is recommended every year, as early in the season as possible  Medical ID should be worn at all times  Continue rotating injection/insertion sites  Annual labs are due:3/25    New insulin regimen:    Humalog insulin via Omnipod insulin pump as follows    Insulin Instructions  OMNIPOD 5 AUTO MODE   Omnipod 5 [id number] Pods Gen 5 Misc   Last edited by Renard Cote RD on 12/31/2024 at 11:12 AM      Basal Rate   Total Basal Dose: 55.2 units/day   Time units/hr   12:00 AM 2.3      Blood Glucose Target   Time mg/dL   12:00  - 110    6:00  - 110   10:00  - 110      Sensitivity Factor   Time mg/dL/unit   12:00 AM 30    6:00 AM 20      Carb Ratio   Time g/unit   12:00 AM 5.5    5:00 PM 6.5

## 2024-12-31 NOTE — PROGRESS NOTES
[facility] Encounter with Harry Cohen for follow up of Type 1 Diabetes. Accompanied today by mom  and dad.      VANGIE JAMISON RD, [facility]    Start time: 10:54AM EST  End Time: 11:10 AM EST    Total time: 16 minutes spent with this clinician      Complete insulin delivery via: Omnipod 5 insulin pump  Insights from device download: Dexcom G6 with phone zachery    Time in Range (  mg/dl): 48 %, 28% high, 24% very high, no low BS, 100% automated  TDD: 94 units     Has had signficant weight gain discussed keeping 75-90 grams carb per meal, he has been grazing  a lot , he discussed grazing on veggies and fruit instead, discussed limiting fruit grazing    Ready to change to the Dexcom G7-reviewed changes and how to start.  Will need Dexcom G7 zachery.     Discussed timely change of the pods since twice pod has run out of insulin and he waited to change.     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    Insulin Instructions  OMNIPOD 5 AUTO MODE   Omnipod 5 [id number] Pods Gen 5 Misc   Last edited by Madan Balderas MD on 9/6/2024 at 12:12 PM      Basal Rate   Total Basal Dose: 28.8 units/day   Time units/hr   12:00 AM 1.2      Blood Glucose Target   Time mg/dL   12:00  - 110    6:00  - 110   10:00  - 110      Sensitivity Factor   Time mg/dL/unit   12:00 AM 30    6:00 AM 20      Carb Ratio   Time g/unit   12:00 AM 5.5    5:00 PM 6.5            Diagnosis date: 4/27/2018   Length of diabetes diagnosis: 6.5 years      Recent Results (from the past 12 hour(s))   AMB POC HEMOGLOBIN A1C    Collection Time: 12/31/24 10:54 AM   Result Value Ref Range    Hemoglobin A1C, POC 8.5 %       Hemoglobin A1C, POC   Date Value Ref Range Status   12/31/2024 8.5 % Final   09/06/2024 7.8 % Final   06/21/2024 7.0 % Final     Hemoglobin A1C   Date Value Ref Range Status   03/27/2024 7.8 (H) 4.8 - 5.6 % Final     Comment:                 Prediabetes: 5.7 - 6.4    
Ratio POC      0 - 29 mg/g creat 12    Hemoglobin A1C, POC      %    Hemoglobin A1C      4.8 - 5.6 % 7.8 (H)    TSH      0.450 - 4.500 uIU/mL 1.210    IgA      90 - 386 mg/dL 86 (L)    Interpretation Note    PDF Not applicable    Tissue Transglutaminase IgA      0 - 3 U/mL <2       Legend:  (L) Low  (H) High    Yearly screening labs done in March 2024 came back with normal thyroid studies, normal lipid panel except for slightly low HDL, negative celiac screen, normal urine screen.     Assessment:       Harry is a 19y.o. male presenting for follow up of type 1 diabetes, under good control.  Hemoglobin A1c today was 8.5 %, above ADA target of less than 7.0%, increased from the last clinic visit.  BG averages slightly above target.  Discussed the importance of entering all carbs into pump for appropriate insulin dose corrections.  We will make some insulin dose changes as shown below.  Send us blood sugar numbers in 2 weeks to review for any further insulin dose changes.  Let us know sooner if he is still having low blood sugars.  Follow-up in clinic in 3 months or sooner if new concerns.        Plan:   Reviewed growth charts and labs with family  Reviewed hypoglycemia and how to manage hypoglycemia including when to use glucagon (for severe hypoglycemia, LOC,seizure)  Reviewed ketones check and how to management positve ketones with family  Hemoglobin A1C reviewed. Correlation between A1C and long term complications like neuropathy, nephropathy and retinopathy reviewed. Acute complications like diabetes ketoacidosis and dehydration and electrolyte abnormalities discussed  Follow up in 3 months or sooner if any concerns          Patient Instructions     Seen for type 1 diabetes.  HbA1c today: 8.5%.  Target is <7.0%.         Plan  Importance of compliance reinforced   Send us records in a week to review for any insulin dose adjustements  Review checking ketones when vomiting, 2 consecutive blood glucose above 350,

## 2025-02-01 DIAGNOSIS — E10.65 TYPE 1 DIABETES MELLITUS WITH HYPERGLYCEMIA (HCC): ICD-10-CM

## 2025-02-03 RX ORDER — PROCHLORPERAZINE 25 MG/1
SUPPOSITORY RECTAL
Qty: 9 EACH | Refills: 2 | Status: SHIPPED | OUTPATIENT
Start: 2025-02-03

## 2025-04-20 DIAGNOSIS — E10.65 TYPE 1 DIABETES MELLITUS WITH HYPERGLYCEMIA (HCC): ICD-10-CM

## 2025-04-21 RX ORDER — INSULIN PMP CART,AUT,G6/7,CNTR
EACH SUBCUTANEOUS
Qty: 45 EACH | Refills: 3 | Status: SHIPPED | OUTPATIENT
Start: 2025-04-21

## 2025-05-10 DIAGNOSIS — E10.65 TYPE 1 DIABETES MELLITUS WITH HYPERGLYCEMIA (HCC): Primary | ICD-10-CM

## 2025-05-12 RX ORDER — ACYCLOVIR 400 MG/1
TABLET ORAL
Qty: 3 EACH | Refills: 3 | Status: SHIPPED | OUTPATIENT
Start: 2025-05-12

## 2025-05-29 ENCOUNTER — OFFICE VISIT (OUTPATIENT)
Age: 20
End: 2025-05-29
Payer: COMMERCIAL

## 2025-05-29 VITALS
OXYGEN SATURATION: 99 % | HEIGHT: 65 IN | TEMPERATURE: 98.2 F | WEIGHT: 204.38 LBS | DIASTOLIC BLOOD PRESSURE: 80 MMHG | BODY MASS INDEX: 34.05 KG/M2 | SYSTOLIC BLOOD PRESSURE: 121 MMHG | HEART RATE: 94 BPM

## 2025-05-29 DIAGNOSIS — E10.9 TYPE 1 DIABETES MELLITUS WITHOUT COMPLICATIONS (HCC): Primary | ICD-10-CM

## 2025-05-29 DIAGNOSIS — E10.65 TYPE 1 DIABETES MELLITUS WITH HYPERGLYCEMIA (HCC): ICD-10-CM

## 2025-05-29 LAB — HBA1C MFR BLD: 8.6 %

## 2025-05-29 PROCEDURE — 83036 HEMOGLOBIN GLYCOSYLATED A1C: CPT | Performed by: STUDENT IN AN ORGANIZED HEALTH CARE EDUCATION/TRAINING PROGRAM

## 2025-05-29 PROCEDURE — 3052F HG A1C>EQUAL 8.0%<EQUAL 9.0%: CPT | Performed by: STUDENT IN AN ORGANIZED HEALTH CARE EDUCATION/TRAINING PROGRAM

## 2025-05-29 PROCEDURE — 95251 CONT GLUC MNTR ANALYSIS I&R: CPT | Performed by: STUDENT IN AN ORGANIZED HEALTH CARE EDUCATION/TRAINING PROGRAM

## 2025-05-29 PROCEDURE — 99215 OFFICE O/P EST HI 40 MIN: CPT | Performed by: STUDENT IN AN ORGANIZED HEALTH CARE EDUCATION/TRAINING PROGRAM

## 2025-05-29 RX ORDER — INSULIN GLARGINE 100 [IU]/ML
INJECTION, SOLUTION SUBCUTANEOUS
Qty: 15 ML | Refills: 1 | Status: SHIPPED | OUTPATIENT
Start: 2025-05-29

## 2025-05-29 NOTE — PROGRESS NOTES
Type 1 DM on OmniPod 5 insulin pump and Dexcom G6      History of present illness:    Harry is a 19y.o. male who [facility] for type 4/27/2018 diabetes. He was present today with his parents.     Harry was originally diagnosed with diabetes at age 8yrs.  His last visit in diabetes clinic was on 12/31/2024 and hemoglobin A1c was 8.5%.  Since then he has been well with no major illness, ER visits or admissions in the hospital.    Poor growth:  growth hormone levels came back normal. He also had a normal thyroid studies.  Labs done on 1/23/2019 were significant for normal LH, FSH, testosterone as well normal male karyotype. Screening labs have so far shown normal  IGF-I and BP 3 level, normal thyroid studies,normal puberty labs, normal male karyotype.  His bone age x-ray at chronological age of 13 years 6 months was approximately 16 years.  At this bone age Harry does not have much room left to grow.  Started letrozole to decrease bone age advancement. Also started growth hormone on 4/6/2019.  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  Discontinued letrozole and growth hormone therapy.     Denies headache,tiredness, problems with peripheral vision,constipation/diarrhea,heat/cold intolerance,polyuria,polydipsia.     Blood glucoses:   Pump download: 2week BG average: 185.  Time in range: 53%, high: 28%, very high: 19%, low: 0%, very low: 0%.      Insulin:  Humalog insulin via Omnipod insulin pump as follows:    Insulin Instructions  OMNIPOD 5 AUTO MODE   insulin lispro 100 UNIT/ML Soln injection vial (HUMALOG,ADMELOG)   Last edited by Madan Balderas MD on 5/29/2025 at 11:23 AM      Basal Rate   Total Basal Dose: 55.2 units/day   Time units/hr   12:00 AM 2.3      Blood Glucose Target   Time mg/dL   12:00  - 110    6:00  - 110   10:00  - 110      Sensitivity Factor   Time mg/dL/unit   12:00 AM 30

## 2025-05-29 NOTE — PROGRESS NOTES
[facility] Encounter with Harry Cohen for follow up of Type 1 Diabetes. Accompanied today by mom  and dad.      HUI HODGSON RD, [facility]    Start time: 10:28 AM EDT  End Time: 10:48 AM    Total time:  20 minutes spent with this clinician      Complete insulin delivery via: Omnipod 5 insulin pump  Insights from device download: Dexcom G6 with phone zachery   Automated 100%    Very High (Above 250 mg/dL): 19 %  High (181-249 mg/dl): 28 %  Time in Range (  mg/dl): 53 %  Low (55- 69 mg/dl): 0 %  Very Low (Below 54 mg/dL): 0 %    TDD/TDI: 88.6 units     Insulin Instructions  OMNIPOD 5 AUTO MODE   insulin lispro 100 UNIT/ML Soln injection vial (HUMALOG,ADMELOG)   Last edited by Hui Hodgson, RD on 5/29/2025 at 10:32 AM      Basal Rate   Total Basal Dose: 55.2 units/day   Time units/hr   12:00 AM 2.3      Blood Glucose Target   Time mg/dL   12:00  - 110    6:00  - 110   10:00  - 110      Sensitivity Factor   Time mg/dL/unit   12:00 AM 30    6:00 AM 20      Carb Ratio   Time g/unit   12:00 AM 5.5    5:00 PM 6.5        [x] Glucagon - GVOKE Reviewed how to use and ensure that they check the expiration date  [x] Ketones - sample pack given today; discussed need to use with stress/illness/elevated blood sugar- less than 6 months old if opened. 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 Past 2 weeks of data include vacation and sleeping in, re: missed morning bolus entries.       New Lantus Rx today **If on pump therapy, reviewed and confirmed basal dosing and supply of insulin to use within 4 hours of pump failure/ pump safety plan reviewed**      Confirmed patient is carrying a well-supplied diabetes go bag that would include all diabetes

## 2025-05-30 ENCOUNTER — RX ONLY (RX ONLY)
Age: 20
End: 2025-05-30

## 2025-05-30 ENCOUNTER — RESULTS FOLLOW-UP (OUTPATIENT)
Age: 20
End: 2025-05-30

## 2025-05-30 LAB
25(OH)D3+25(OH)D2 SERPL-MCNC: 30.6 NG/ML (ref 30–100)
ALBUMIN SERPL-MCNC: 4.9 G/DL (ref 4.3–5.2)
ALBUMIN/CREAT UR: <2 MG/G CREAT (ref 0–29)
ALP SERPL-CCNC: 149 IU/L (ref 51–125)
ALT SERPL-CCNC: 15 IU/L (ref 0–44)
AST SERPL-CCNC: 17 IU/L (ref 0–40)
BILIRUB SERPL-MCNC: 0.3 MG/DL (ref 0–1.2)
BUN SERPL-MCNC: 7 MG/DL (ref 6–20)
BUN/CREAT SERPL: 9 (ref 9–20)
CALCIUM SERPL-MCNC: 9.7 MG/DL (ref 8.7–10.2)
CHLORIDE SERPL-SCNC: 94 MMOL/L (ref 96–106)
CHOLEST SERPL-MCNC: 152 MG/DL (ref 100–199)
CO2 SERPL-SCNC: 21 MMOL/L (ref 20–29)
CREAT SERPL-MCNC: 0.79 MG/DL (ref 0.76–1.27)
CREAT UR-MCNC: 123.5 MG/DL
EGFRCR SERPLBLD CKD-EPI 2021: 130 ML/MIN/1.73
GLOBULIN SER CALC-MCNC: 2.1 G/DL (ref 1.5–4.5)
GLUCOSE SERPL-MCNC: 148 MG/DL (ref 70–99)
HBA1C MFR BLD: 8.7 % (ref 4.8–5.6)
HDLC SERPL-MCNC: 40 MG/DL
IGA SERPL-MCNC: 61 MG/DL (ref 90–386)
IMP & REVIEW OF LAB RESULTS: NORMAL
LDLC SERPL CALC-MCNC: 93 MG/DL (ref 0–99)
Lab: NORMAL
MICROALBUMIN UR-MCNC: <3 UG/ML
POTASSIUM SERPL-SCNC: 4.3 MMOL/L (ref 3.5–5.2)
PROT SERPL-MCNC: 7 G/DL (ref 6–8.5)
SODIUM SERPL-SCNC: 131 MMOL/L (ref 134–144)
TRIGL SERPL-MCNC: 104 MG/DL (ref 0–149)
TSH SERPL DL<=0.005 MIU/L-ACNC: 1.49 UIU/ML (ref 0.45–4.5)
VLDLC SERPL CALC-MCNC: 19 MG/DL (ref 5–40)

## 2025-05-30 RX ORDER — CLOBETASOL PROPIONATE 0.5 MG/G
AEROSOL, FOAM TOPICAL
Qty: 100 | Refills: 0 | Status: ERX

## 2025-05-30 RX ORDER — KETOCONAZOLE 20 MG/ML
SHAMPOO, SUSPENSION TOPICAL
Qty: 120 | Refills: 0 | Status: ERX

## 2025-05-30 RX ORDER — CLOBETASOL PROPIONATE 0.5 MG/G
AEROSOL, FOAM TOPICAL
Qty: 100 | Refills: 2 | Status: CANCELLED

## 2025-05-30 RX ORDER — KETOCONAZOLE 20 MG/ML
SHAMPOO, SUSPENSION TOPICAL
Qty: 120 | Refills: 6 | Status: CANCELLED

## 2025-05-31 LAB — TTG IGA SER-ACNC: <2 U/ML (ref 0–3)

## 2025-07-31 DIAGNOSIS — E10.65 TYPE 1 DIABETES MELLITUS WITH HYPERGLYCEMIA (HCC): ICD-10-CM

## 2025-07-31 RX ORDER — ACYCLOVIR 400 MG/1
TABLET ORAL
Qty: 3 EACH | Refills: 3 | Status: SHIPPED | OUTPATIENT
Start: 2025-07-31

## 2025-08-06 DIAGNOSIS — E10.9 TYPE 1 DIABETES MELLITUS WITHOUT COMPLICATIONS (HCC): ICD-10-CM

## 2025-08-06 RX ORDER — INSULIN LISPRO 100 [IU]/ML
INJECTION, SOLUTION INTRAVENOUS; SUBCUTANEOUS
Qty: 90 ML | Refills: 3 | Status: SHIPPED | OUTPATIENT
Start: 2025-08-06